# Patient Record
Sex: MALE | Race: WHITE | Employment: FULL TIME
[De-identification: names, ages, dates, MRNs, and addresses within clinical notes are randomized per-mention and may not be internally consistent; named-entity substitution may affect disease eponyms.]

---

## 2024-05-08 NOTE — PROGRESS NOTES
[facility]  [address] [place] [address] Phone: [phone] Fax    Plan of Care / Statement of Necessity for Occupational Therapy Services      Patient Name: Andrew Gao : 1980   Medical   Diagnosis: Pain in right wrist [M25.531] Treatment Diagnosis: Pain in right wrist [M25.531]   Onset Date:  Payor: Payor:  EAST / Plan:  EAST PRIME / Product Type: *No Product type* /    Referral Source: Mendoza Pringle MD Start of Care (SOC): 2024   Prior Hospitalization: See medical history Provider #: [id number]   Prior Level of Function: Independent in ADLs/ IADLs prior to start of symptoms [employer] for [employer] years- [employer]. Desk work- typing and mouse clicking   Comorbidities: Per pt, none   Medications: Verified on Patient Summary List     Assessment / key information:     Subjective: pt is a right hand dominant, 44 y.o. male who presents to evaluation for right wrist pain. Patient reports 7/10 pain rating along with chief c/o of increased popping sensation. Patient reports they do use ice at home. Pt does not use oral/topical medications. Per pt, his right wrist has been popping since  however was able to ignore it until pain gradually started to increase. 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 Pt is unsure if it is helping and reports right forearm tightness. Pt states right thumb locks and continues to pop since use of ergonomic mouse. Pt reports referring provider took x-rays however has not been informed of results. Imaging findings not provided to OTR. Pt states he previously dislocated his right 5th did PIP joint in high school however reports no concerns at this time.    Based on objective measurements, pt presents with increased left styloid level and MCP circumference measurements, increased right PIP circumference, minimally

## 2024-05-10 ENCOUNTER — HOSPITAL ENCOUNTER (OUTPATIENT)
Facility: HOSPITAL | Age: 44
Setting detail: RECURRING SERIES
Discharge: HOME | End: 2024-05-13
Payer: OTHER GOVERNMENT

## 2024-05-10 PROCEDURE — 97535 SELF CARE MNGMENT TRAINING: CPT

## 2024-05-10 PROCEDURE — 97165 OT EVAL LOW COMPLEX 30 MIN: CPT

## 2024-05-10 NOTE — PROGRESS NOTES
PHYSICAL / OCCUPATIONAL THERAPY - DAILY TREATMENT NOTE    Patient Name: Andrew Gao    Date: 5/10/2024    : 1980  Insurance: Payor:  EAST / Plan:  EAST PRIME / Product Type: *No Product type* /        Ins Auth due:  HERNANDEZ Next PN/ RC Due By:    2024         Patient  verified Yes     Visit #   Current / Total 1 12   Time   In / Out 1125 1200   Pain   In / Out 7 6   Subjective Functional Status/Changes: SEE POC     TREATMENT AREA =  Pain in right wrist [M25.531]    OBJECTIVE      Therapeutic Procedures:    Eval= 27 minutes      8  13170 Self Care/Home Management (timed):  improve patient knowledge and understanding of pain reducing techniques, positioning, posture/ergonomics, and activity modification  to increase ability to complete ADLs/IADLs independently  (see flow sheet as applicable)    Work station ergonomics HEP    Educated pt on activity modification for work and IADLs to decrease symptoms with demonstration provided.             35  [facility] Totals Reminder: bill using total billable min of TIMED therapeutic procedures (example: do not include dry needle or estim unattended, both untimed codes, in totals to left)  8-22 min = 1 unit; 23-37 min = 2 units; 38-52 min = 3 units; 53-67 min = 4 units; 68-82 min = 5 units   Total Total     [x]  Patient Education billed concurrently with other procedures   [x] Review HEP    [] Progressed/Changed HEP, detail:    [] Other detail:       Objective Information/Functional Measures/Assessment    SEE POC           Assessment/ Plan    SEE POC           Patient will continue to benefit from skilled PT / OT services to modify and progress therapeutic interventions, analyze and address ROM deficits, analyze and address strength deficits, analyze and address soft tissue restrictions, analyze and cue for proper movement patterns, analyze and modify for postural abnormalities, and instruct in home and community integration to address functional deficits

## 2024-05-29 NOTE — PROGRESS NOTES
PHYSICAL / OCCUPATIONAL THERAPY - DAILY TREATMENT NOTE    Patient Name: Andrew Gao    Date: 2024    : 1980  Insurance: Payor: [employer] EAST / Plan:  EAST PRIME / Product Type: *No Product type* /        Ins Auth due:  HERNANDEZ Next PN/ RC Due By:    2024         Patient  verified Yes     Visit #   Current / Total 2 12   Time   In / Out 120 158   Pain   In / Out 5-6 1-2   Subjective Functional Status/Changes: \"I've been using KT tape.\"     TREATMENT AREA =  Pain in right wrist [M25.531]    OBJECTIVE      Therapeutic Procedures:      Modalities Rationale:   decrease pain, increase tissue extensibility, and increase muscle contraction/control to improve the patient’s ability to clinic tasks.                                                                    1:1 time/Billable      min [] Estim, type/location:       [x]  att       []  w/ice    []  w/heat    min []  Ultrasound: Duty Cycle:  Frequency:  W/cm2                                                               Non 1:1 time/Non billable      min []  Ice     []  Heat     Location/Position:                                                                Non 1:1 time/Billable   8 min [x]  Paraffin:       Location: right hand      min []  Vasopneumatic Device Pressure/Temp:  Location:  Pre:  Post:      min []  Fluido/[place]: Location:  Position: AROM   [x] Skin assessment post-treatment (if applicable):    [x]  intact    []  redness- no adverse reaction     []redness - adverse reaction:          15  68104 Self Care/Home Management (timed):  improve patient knowledge and understanding of pain reducing techniques, positioning, posture/ergonomics, and activity modification  to increase ability to complete ADLs/IADLs independently  (see flow sheet as applicable)    Recheck for PN    Work station ergonomics HEP Reviewed    Educated pt on activity modification for work and IADLs to decrease symptoms with demonstration provided.    Educated pt on the

## 2024-05-31 ENCOUNTER — HOSPITAL ENCOUNTER (OUTPATIENT)
Facility: HOSPITAL | Age: 44
Setting detail: RECURRING SERIES
End: 2024-05-31
Payer: OTHER GOVERNMENT

## 2024-05-31 PROCEDURE — 97535 SELF CARE MNGMENT TRAINING: CPT

## 2024-05-31 PROCEDURE — 97110 THERAPEUTIC EXERCISES: CPT

## 2024-05-31 PROCEDURE — 97018 PARAFFIN BATH THERAPY: CPT

## 2024-05-31 NOTE — PROGRESS NOTES
[facility] AT [facility]  [address] [place] [address]   Phone: [phone] Fax: [phone]  PROGRESS NOTE  Patient Name: Andrew Gao : 1980   Treatment/Medical Diagnosis: Pain in right wrist [M25.531] Pain in right wrist [M25.531]   Referral Source: Mendoza Pringle MD     Date of Initial Visit: 5/10/2024 Attended Visits: 2 Missed Visits: 0     SUMMARY OF TREATMENT  Pt with 2 follow-up visits since start of care on 5/10/2024. Pt with 0 missed appointments since initial evaluation. Pt progressing with implementation of activity modification strategies at work and in the gym. OTR educated pt on the benefits of heat/ ice at home and provided with wrist ROM HEP today. Pt has shown no changes in right wrist extension and right radial deviation ROM. Pt right thumb popping throughout session.  Pt has met goals for right  and right tip pinch strength. Pt reports improvements with pain during strength testing today.    Pt will benefit from skilled OT services to address above listed functional deficits, maximize functional abilities of the right wrist/ hand, and increase overall independence and quality of life.    CURRENT STATUS  Patient will be independent with HEP for right ROM/stretches/strengthening to increase ROM and strength for ADL/IADL tasks.   Status at Eval: work station ergonomics HEP  Status at PN (24): wrist ROM HEP, continue to address     Patient will be independent in demonstrating and performing activity modification strategies to aid in success for work, self-care, meal preparation and home management tasks.   Status at Eval: Not initiated  Status at PN (24): Educated pt on activity modification for work and in the gym to decrease symptoms with demonstration provided. Continue to address     Patient will be independent in demonstrating and performing scar management strategies to promote healing and improve  Educated pt on the benefits of heat/ ice at home        Pt will increase Right wrist extension to 68 degrees or greater to improve performance in ADLs/IADLs.    Status at Eval: extension= 66 deg  Status at PN (5/31/24): 66 degrees     Pt will demonstrate increased radial deviation of the Right wrist to 18 degrees or more in order to turn a key to lock/unlock their door.   Status at Eval: 16 degrees  Status at PN (5/31/24): 16 degrees        Pt will increase Right hand  strength by 2# or greater and without any reports of pain in order to open a tight jar.   Status at Eval: 82# p!  Status at PN (5/31/24):87# p!     Patient will increase Right tip pinch strength by 3# or greater in order to open food packages.   Status at Eval:tip= 11# p!  Status at PN (5/31/24): 15#    Frequency / Duration:           Patient to be seen   1-2   times per week for   12    visits     RECOMMENDATIONS  We would like to continue therapy for progress towards goals stated above. Continue per initial Plan of Care.        If you have any questions/comments please contact us directly at [phone]   Thank you for allowing us to assist in the care of your patient.        Therapist Signature: Ashley Zafar OT Date: 5/31/2024   Reporting Period  5/10/2024 - 5/31/2024 Time: 2:03 PM

## 2024-06-12 ENCOUNTER — HOSPITAL ENCOUNTER (OUTPATIENT)
Facility: HOSPITAL | Age: 44
Setting detail: RECURRING SERIES
Discharge: HOME | End: 2024-06-15
Payer: OTHER GOVERNMENT

## 2024-06-12 PROCEDURE — 97110 THERAPEUTIC EXERCISES: CPT

## 2024-06-12 PROCEDURE — 97018 PARAFFIN BATH THERAPY: CPT

## 2024-06-12 NOTE — PROGRESS NOTES
strength, coordination, and proprioception to  increase independence for ADL/IADL tasks (see flow sheet as applicable)    Lumbrical theraputty HEP X 5 each- medium   Theraputty- removal of 1/4 inch pegs from medium/ soft theraputty X 15 using right hand  Ball rolls for right wrist flexion/ extension X 15            32  [facility] Totals Reminder: bill using total billable min of TIMED therapeutic procedures (example: do not include dry needle or estim unattended, both untimed codes, in totals to left)  8-22 min = 1 unit; 23-37 min = 2 units; 38-52 min = 3 units; 53-67 min = 4 units; 68-82 min = 5 units   Total Total     [x]  Patient Education billed concurrently with other procedures   [x] Review HEP    [x] Progressed/Changed HEP, detail: wrist HEP, Lumbrical theraputty HEP X 5 each- medium   [] Other detail:       Objective Information/Functional Measures/Assessment    Right thumb IP popping during ball rolls X 15           Assessment/ Plan    Continue with ultrasound and tendon ROM.           Patient will continue to benefit from skilled PT / OT services to modify and progress therapeutic interventions, analyze and address ROM deficits, analyze and address strength deficits, analyze and address soft tissue restrictions, analyze and cue for proper movement patterns, analyze and modify for postural abnormalities, and instruct in home and community integration to address functional deficits and attain remaining goals.    Progress toward goals / Updated goals:  []  See Progress Note/Recertification    Patient will be independent with HEP for right ROM/stretches/strengthening to increase ROM and strength for ADL/IADL tasks.   Status at Eval: work station ergonomics HEP  Status at PN (5/31/24): wrist ROM HEP  Current states (6/12/2024): Lumbrical theraputty HEP X 5 each- medium      Patient will be independent in demonstrating and performing activity modification strategies to aid in success for work, self-care, meal